# Patient Record
Sex: MALE | Race: WHITE
[De-identification: names, ages, dates, MRNs, and addresses within clinical notes are randomized per-mention and may not be internally consistent; named-entity substitution may affect disease eponyms.]

---

## 2017-04-07 ENCOUNTER — HOSPITAL ENCOUNTER (INPATIENT)
Dept: HOSPITAL 74 - YASAS | Age: 32
LOS: 4 days | Discharge: HOME | End: 2017-04-11
Attending: INTERNAL MEDICINE | Admitting: INTERNAL MEDICINE
Payer: COMMERCIAL

## 2017-04-07 VITALS — BODY MASS INDEX: 20.9 KG/M2

## 2017-04-07 DIAGNOSIS — A60.02: ICD-10-CM

## 2017-04-07 DIAGNOSIS — F17.210: ICD-10-CM

## 2017-04-07 DIAGNOSIS — F16.10: ICD-10-CM

## 2017-04-07 DIAGNOSIS — F13.230: Primary | ICD-10-CM

## 2017-04-07 DIAGNOSIS — F14.20: ICD-10-CM

## 2017-04-07 DIAGNOSIS — F12.20: ICD-10-CM

## 2017-04-07 DIAGNOSIS — F10.230: ICD-10-CM

## 2017-04-07 DIAGNOSIS — F90.2: ICD-10-CM

## 2017-04-07 DIAGNOSIS — F32.9: ICD-10-CM

## 2017-04-07 DIAGNOSIS — F19.24: ICD-10-CM

## 2017-04-07 DIAGNOSIS — J45.909: ICD-10-CM

## 2017-04-07 LAB
APPEARANCE UR: CLEAR
BILIRUB UR STRIP.AUTO-MCNC: NEGATIVE MG/DL
COLOR UR: YELLOW
KETONES UR QL STRIP: NEGATIVE
LEUKOCYTE ESTERASE UR QL STRIP.AUTO: NEGATIVE
NITRITE UR QL STRIP: NEGATIVE
PH UR: 6 [PH] (ref 5–8)
PROT UR QL STRIP: NEGATIVE
PROT UR QL STRIP: NEGATIVE
RBC # UR STRIP: NEGATIVE /UL
SP GR UR: 1.03 (ref 1–1.03)
UROBILINOGEN UR STRIP-MCNC: (no result) E.U./DL (ref 0.2–1)

## 2017-04-07 RX ADMIN — Medication SCH MG: at 23:09

## 2017-04-07 RX ADMIN — NICOTINE SCH: 14 PATCH, EXTENDED RELEASE TRANSDERMAL at 23:08

## 2017-04-07 NOTE — HP
CIWA Score





- CIWA Score


Nausea/Vomitin-No Nausea/No Vomiting


Muscle Tremors: 4-Moderate,w/Arms Extend


Anxiety: 4-Mod. Anxious/Guarded


Agitation: 4-Moderately Restless


Paroxysmal Sweats: 1-Minimal Palms Moist


Orientation: 0-Oriented


Tacttile Disturbances: 0-None


Auditory Disturbances: 0-None


Visual Disturbances: 2-Mild Sensitivity


Headache: 2-Mild


CIWA-Ar Total Score: 17





Admission ROS [facility]





- HPI


Chief Complaint: 


C/O WITHDRAWAL SX'S. SEEKING DETOX TXMENT


Allergies/Adverse Reactions: 


 Allergies











Allergy/AdvReac Type Severity Reaction Status Date / Time


 


No Known Allergies Allergy   Verified 10/15/16 16:50











History of Present Illness: 


31 Y.O Male with polysubstance abuse admitted to detox for alcoholism. client 

is known to [facility]. reports last detox 10/2016. reports longest clean time 4 

months.


Exam Limitations: No Limitations





- Ebola screening


Have you traveled outside of the country in the last 21 days: No


Have you had contact with anyone from an Ebola affected area: No


Have you been sick,other than usual withdrawal symptoms: No


Do you have a fever: No





- Review of Systems


Constitutional: Loss of Appetite, Unintentional Wgt. Loss


EENT: reports: No Symptoms Reported


Respiratory: reports: Cough, Shortness of Breath, Other (asthma)


Cardiac: reports: No Symptoms Reported


GI: reports: Poor Appetite


: reports: No Symptoms Reported


Musculoskeletal: reports: No Symptoms Reported


Integumentary: reports: No Symptoms Reported


Neuro: reports: No Symptoms reported


Endocrine: reports: No Symptoms Reported


Hematology: reports: No Symptoms Reported


Psychiatric: reports: Anxious


Other Systems: Reviewed and Negative





Patient History





- Patient Medical History


Hx Anemia: No


Hx Asthma: Yes (ON ALBUTEROL INHALER)


Hx Chronic Obstructive Pulmonary Disease (COPD): No


Hx Cancer: No


Hx Cardiac Disorders: No


Hx Congestive Heart Failure: No


Hx Hypertension: No


Hx Hypercholesterolemia: No


Hx Pacemaker: No


HX Cerebrovascular Accident: No


Hx Seizures: Yes (not sure)


Hx Dementia: No


Hx Diabetes: Yes


Hx Gastrointestinal Disorders: No


Hx Liver Disease: No


Hx Genitourinary Disorders: Yes (HSV TYPE II)


Hx Sexually Transmitted Disorders: Yes (GENITAL HERPES)


Hx Renal Disease (ESRD): No


Hx Thyroid Disease: No


Hx Human Immunodeficiency Virus (HIV): No


Hx Hepatitis C: No


Hx Depression: Yes (ANXIETY)


Hx Suicide Attempt: No


Hx Bipolar Disorder: Yes


Hx Schizophrenia: Yes





- Patient Surgical History


Past Surgical History: Yes


Hx Neurologic Surgery: No


Hx Cataract Extraction: No


Hx Cardiac Surgery: No


Hx Lung Surgery: No


Hx Breast Surgery: No


Hx Breast Biopsy: No


Hx Abdominal Surgery: No


Hx Appendectomy: No


Hx Cholecystectomy: No


Hx Genitourinary Surgery: No


Hx  Section: No


Hx Orthopedic Surgery: No


Other Surgical History: surgery for deviated septum 


Anesthesia Reaction: No





- PPD History


Previous Implant?: Yes


Documented Results: Negative w/proof


Implanted On Prior [facility]mission?: Yes


Date: 12/07/15


Results: 0 mm


PPD to be Administered?: Yes





- Smoking Cessation


Smoking history: Current every day smoker


Have you smoked in the past 12 months: Yes


Aproximately how many cigarettes per day: 10


Cigars Per Day: 0


Hx Chewing Tobacco Use: No


Initiated information on smoking cessation: Yes


'Breaking Loose' booklet given: 17





- Substance & Tx. History


Hx Alcohol Use: Yes


Hx Substance Use: Yes


Substance Use Type: Alcohol, Cocaine, Marijuana, Tranquilizers (PCP/XANA)


Hx Substance Use Treatment: Yes ([facility])





- Substances Abused


  ** BEER/LIQUOR


Route: Oral


Frequency: Daily


Amount used: 1- 6 PACK/1 PINT


Age of first use: 14


Date of Last Use: 17 (THIS MORNING)





  ** COCAINE


Route: Smoking


Frequency: Daily


Amount used: $50


Age of first use: 23


Date of Last Use: 17





  ** THC


Route: Smoking


Frequency: Daily


Amount used: DIME


Age of first use: 13


Date of Last Use: 17





  ** PCP


Route: Smoking


Frequency: 1-3 times last 30 days


Amount used: 1 BAG


Age of first use: 19


Date of Last Use: 17





  ** XANAX


Route: Oral


Frequency: 3-6 times per week


Age of first use: 23


Date of Last Use: 17





Family Disease History





- Family Disease History


Family Disease History: Diabetes: Grandparent, Father (alcohol,dsa sober [id number]

), Mother (alcohol, on MMTP,HTN), Heart Disease: Mother





Admission Physical Exam [facility]





- Vital Signs


Vital Signs: 


 Vital Signs - 24 hr











  17





  21:27


 


Temperature 87 F L


 


Pulse Rate 81


 


Respiratory 20





Rate 


 


Blood Pressure 123/69














- Physical


General Appearance: Yes: Appropriately Dressed, Tremorous, Anxious


HEENTM: Yes: EOMI, Normocephalic, JABARI, Pharynx Normal, Rhinorrhea


Respiratory: Yes: Chest Non-Tender, Lungs Clear, Normal Breath Sounds, No 

Respiratory Distress, No Accessory Muscle Use


Neck: Yes: No masses,lesions,Nodules, Supple, Trachea in good position


Breast: Yes: Breast Exam Deferred


Cardiology: Yes: Regular Rhythm, Regular Rate, S1, S2


Abdominal: Yes: Normal Bowel Sounds, Non Tender, Soft


Genitourinary: Yes: Within Normal Limits


Back: Yes: Normal Inspection


Musculoskeletal: Yes: full range of Motion, Gait Steady


Extremities: Yes: Normal Capillary Refill, Normal Range of Motion, Non-Tender, 

Tremors


Neurological: Yes: CNs II-XII NML intact, Fully Oriented, Alert, Motor Strength 

5/5


Integumentary: Yes: Normal Color, Warm, Moist


Lymphatic: Yes: Within Normal Limits





- Diagnostic


(1) Asthma


Current Visit: Yes   Status: Chronic





(2) Alcohol dependence with uncomplicated withdrawal


Current Visit: Yes   Status: Chronic





(3) PCP abuse


Current Visit: Yes   Status: Chronic





(4) Nicotine dependence


Current Visit: Yes   Status: Chronic   Qualifiers: 


     Nicotine product type: cigarettes     Substance use status: uncomplicated 

       Qualified Code(s): F17.210 - Nicotine dependence, cigarettes, 

uncomplicated  





(5) Cocaine dependence, uncomplicated


Current Visit: Yes   Status: Acute





(6) Sedative, hypnotic or anxiolytic dependence with withdrawal, uncomplicated


Current Visit: Yes   Status: Acute





(7) Cannabis dependence, uncomplicated


Current Visit: Yes   Status: Chronic








Cleared for Admission [facility]





- Detox or Rehab


[facility] Level of Care: Medically Managed


Detox Regimen/Protocol: Librium





[facility] Breath Alcohol Content


Breath Alcohol Content: 0





Urine Drug Screen





- Results


Drug Screen Negative: No


Urine Drug Screen Results: THC-Marijuana, JAIME-Cocaine, OPI-Opiates, BZO-

Benzodiazepines

## 2017-04-08 LAB
ALBUMIN SERPL-MCNC: 3.8 G/DL (ref 3.4–5)
ALP SERPL-CCNC: 49 U/L (ref 45–117)
ALT SERPL-CCNC: 19 U/L (ref 12–78)
ANION GAP SERPL CALC-SCNC: 11 MMOL/L (ref 8–16)
AST SERPL-CCNC: 15 U/L (ref 15–37)
BILIRUB SERPL-MCNC: 0.6 MG/DL (ref 0.2–1)
CALCIUM SERPL-MCNC: 9 MG/DL (ref 8.5–10.1)
CO2 SERPL-SCNC: 27 MMOL/L (ref 21–32)
COCKROFT - GAULT: 109.87
CREAT SERPL-MCNC: 1 MG/DL (ref 0.7–1.3)
DEPRECATED RDW RBC AUTO: 15.2 % (ref 11.9–15.9)
GLUCOSE SERPL-MCNC: 72 MG/DL (ref 74–106)
MCH RBC QN AUTO: 31 PG (ref 25.7–33.7)
MCHC RBC AUTO-ENTMCNC: 33.3 G/DL (ref 32–35.9)
MCV RBC: 93.3 FL (ref 80–96)
PLATELET # BLD AUTO: 233 K/MM3 (ref 134–434)
PMV BLD: 9.6 FL (ref 7.5–11.1)
PROT SERPL-MCNC: 7 G/DL (ref 6.4–8.2)
WBC # BLD AUTO: 5.1 K/MM3 (ref 4–10)

## 2017-04-08 RX ADMIN — NICOTINE SCH: 14 PATCH, EXTENDED RELEASE TRANSDERMAL at 10:26

## 2017-04-08 RX ADMIN — Medication SCH TAB: at 10:26

## 2017-04-08 RX ADMIN — QUETIAPINE FUMARATE SCH MG: 100 TABLET ORAL at 22:40

## 2017-04-08 RX ADMIN — Medication SCH MG: at 22:40

## 2017-04-08 NOTE — CONSULT
[facility] Psychiatric Consult





- Data


Date of interview: 04/08/17


Admission source: [facility]


Identifying data: Readmission to [facility] for this 30 y/o  male 

seeking detox treatment for alcohol,cannabis,cocaine and phencyclidine 

dependence.Patient is single without children,domiciled,unemployed and 

supported on Public Assistance.


Substance Abuse History: - Smoking Cessation.  Smoking history: Current every 

day smoker.  Have you smoked in the past 12 months: Yes.  Aproximately how many 

cigarettes per day: 10.  Cigars Per Day: 0.  Hx Chewing Tobacco Use: No.  

Initiated information on smoking cessation: Yes.  'Breaking Loose' booklet given

: 04/07/17.  - Substance & Tx. History.  Hx Alcohol Use: Yes.  Hx Substance Use

: Yes.  Substance Use Type: Alcohol, Cocaine, Marijuana, Tranquilizers (PCP/XANA

).  Hx Substance Use Treatment: Yes ([facility]).  - Substances Abused.  ** BEER/

LIQUOR.  Route: Oral.  Frequency: Daily.  Amount used: 1- 6 PACK/1 PINT.  Age 

of first use: 14.  Date of Last Use: 04/07/17 (THIS MORNING).  ** COCAINE.  

Route: Smoking.  Frequency: Daily.  Amount used: $50.  Age of first use: 23.  

Date of Last Use: 04/06/17.  ** THC.  Route: Smoking.  Frequency: Daily.  

Amount used: DIME.  Age of first use: 13.  Date of Last Use: 04/06/17.  ** PCP.

  Route: Smoking.  Frequency: 1-3 times last 30 days.  Amount used: 1 BAG.  Age 

of first use: 19.  Date of Last Use: 04/05/17.  ** XANAX.  Route: Oral.  

Frequency: 3-6 times per week.  Age of first use: 23.  Date of Last Use: 04/05/ 17


Medical History: Bronchial asthma,withdrawal-related seizures,herpes genitalis 

and a history of surgery for deviated septum (2013).


Psychiatric History: Diagnosed with ADHD and Bipolar Disorder.Used to be on 

ritalin (age 8 to 14). Prescribed seroquel 100 mg/hs + 50 mg/day by a private 

psychiatrist in the [place].Mr Dominguez denies history of suicide attempts.


Physical/Sexual Abuse/Trauma History: No history.


Additional Comment: Urine Drug Screen Results: THC-Marijuana, JAIME-Cocaine, OPI-

Opiates, BZO-Benzodiazepines.Noted.





Mental Status Exam





- Mental Status Exam


Alert and Oriented to: Time, Place, Person


Cognitive Function: Good


Patient Appearance: Well Groomed


Mood: Nervous, Withdrawn, Apprehensive


Affect: Mood Congruent


Patient Behavior: Fatigued, Appropriate, Cooperative


Speech Pattern: Clear, Appropriate


Voice Loudness: Normal


Thought Process: Goal Oriented


Thought Disorder: Not Present


Hallucinations: Denies


Suicidal Ideation: Denies


Homicidal Ideation: Denies


Insight/Judgement: Poor


Sleep: Fair


Appetite: Good


Muscle strength/Tone: Normal


Gait/Station: Normal





Psychiatric Findings





- Problem List (Axis 1, 2,3)


(1) Cocaine dependence, uncomplicated


Current Visit: Yes   Status: Acute





(2) Sedative, hypnotic or anxiolytic dependence with withdrawal, uncomplicated


Current Visit: Yes   Status: Acute





(3) Alcohol dependence with uncomplicated withdrawal


Current Visit: Yes   Status: Acute





(4) Cannabis dependence, uncomplicated


Current Visit: Yes   Status: Acute





(5) Nicotine dependence


Current Visit: Yes   Status: Acute   Qualifiers: 


     Nicotine product type: cigarettes     Substance use status: uncomplicated 

       Qualified Code(s): F17.210 - Nicotine dependence, cigarettes, 

uncomplicated  





(6) PCP abuse


Current Visit: Yes   Status: Acute





(7) Drug-induced mood disorder


Current Visit: Yes   Status: Acute





(8) ADHD (attention deficit hyperactivity disorder)


Current Visit: Yes   Status: Chronic   Qualifiers: 


     Attention deficit-hyperactivity disorder type: combined inattentive-

hyperactive     Qualified Code(s): F90.2 - Attention-deficit hyperactivity 

disorder, combined type  





(9) Bipolar disorder


Current Visit: Yes   Status: Chronic   


Comment: History.








(10) Asthma


Current Visit: Yes   Status: Chronic





(11) Herpes simplex of male genitalia


Current Visit: Yes   Status: Chronic








- Initial Treatment Plan


Initial Treatment Plan: Psychoeducation.Detoxification.Seroquel 100 mg po 

hs.Side effects/benefits discussed with the patient.He agrees with this 

careplan.Observation.

## 2017-04-08 NOTE — PN
S CIWA





- CIWA Score


Nausea/Vomitin-No Nausea/No Vomiting


Muscle Tremors: 4-Moderate,w/Arms Extend


Anxiety: 3


Agitation: 3


Paroxysmal Sweats: 3


Orientation: 0-Oriented


Tacttile Disturbances: 0-None


Auditory Disturbances: 0-None


Visual Disturbances: 0-None


Headache: 0-None Present


CIWA-Ar Total Score: 13





B[facility] Progress Note (SOAP)


Subjective: 


Sweating,interrupted sleep,restless,tremors,anxiety


Objective: 





17 14:36


 Vital Signs - 8 hr











  17





  09:37 13:58


 


Temperature 98.4 F 96.9 F L


 


Pulse Rate 74 94 H


 


Respiratory 18 18





Rate  


 


Blood Pressure 107/68 100/76








 Laboratory Tests











  17





  22:18 06:00 06:00


 


WBC   5.1  D 


 


RBC   4.19 


 


Hgb   13.0 


 


Hct   39.1 


 


MCV   93.3 


 


MCHC   33.3 


 


RDW   15.2 


 


Plt Count   233 


 


MPV   9.6 


 


Sodium    143


 


Potassium    4.0


 


Chloride    105


 


Carbon Dioxide    27


 


Anion Gap    11


 


BUN    6 L D


 


Creatinine    1.0


 


Creat Clearance w eGFR    > 60


 


Random Glucose    72 L D


 


Calcium    9.0


 


Total Bilirubin    0.6


 


AST    15


 


ALT    19  D


 


Alkaline Phosphatase    49


 


Total Protein    7.0


 


Albumin    3.8


 


Urine Color  Yellow  


 


Urine Appearance  Clear  


 


Urine pH  6.0  


 


Ur Specific Gravity  1.026  


 


Urine Protein  Negative  


 


Urine Glucose (UA)  Negative  


 


Urine Ketones  Negative  


 


Urine Blood  Negative  


 


Urine Nitrite  Negative  


 


Urine Bilirubin  Negative  


 


Urine Urobilinogen  4.0 e.u/dl  


 


Ur Leukocyte Esterase  Negative  








labs noted


Assessment: 





17 14:38


withdrawal sx


Plan: 


Continue detox

## 2017-04-09 RX ADMIN — NICOTINE SCH: 14 PATCH, EXTENDED RELEASE TRANSDERMAL at 10:22

## 2017-04-09 RX ADMIN — Medication SCH MG: at 22:31

## 2017-04-09 RX ADMIN — Medication SCH TAB: at 10:21

## 2017-04-09 RX ADMIN — QUETIAPINE FUMARATE SCH MG: 100 TABLET ORAL at 22:31

## 2017-04-09 NOTE — PN
S CIWA





- CIWA Score


Nausea/Vomitin-No Nausea/No Vomiting


Muscle Tremors: 3


Anxiety: 4-Mod. Anxious/Guarded


Agitation: 3


Paroxysmal Sweats: 3


Orientation: 0-Oriented


Tacttile Disturbances: 0-None


Auditory Disturbances: 0-None


Visual Disturbances: 0-None


Headache: 0-None Present


CIWA-Ar Total Score: 13





BHS Progress Note (SOAP)


Subjective: 


Anxiety,tremors,sweating,interrupted sleep,restless


Objective: 





17 13:57


 Vital Signs - 8 hr











  17





  06:35 09:41 13:41


 


Temperature 95.1 F L 96 F L 95.6 F L


 


Pulse Rate 82 82 94 H


 


Respiratory 18 18 18





Rate   


 


Blood Pressure 101/66 98/68 104/65








 Laboratory Tests











  17





  22:18 06:00 06:00


 


WBC   5.1  D 


 


RBC   4.19 


 


Hgb   13.0 


 


Hct   39.1 


 


MCV   93.3 


 


MCHC   33.3 


 


RDW   15.2 


 


Plt Count   233 


 


MPV   9.6 


 


Sodium    143


 


Potassium    4.0


 


Chloride    105


 


Carbon Dioxide    27


 


Anion Gap    11


 


BUN    6 L D


 


Creatinine    1.0


 


Creat Clearance w eGFR    > 60


 


Random Glucose    72 L D


 


Calcium    9.0


 


Total Bilirubin    0.6


 


AST    15


 


ALT    19  D


 


Alkaline Phosphatase    49


 


Total Protein    7.0


 


Albumin    3.8


 


Urine Color  Yellow  


 


Urine Appearance  Clear  


 


Urine pH  6.0  


 


Ur Specific Gravity  1.026  


 


Urine Protein  Negative  


 


Urine Glucose (UA)  Negative  


 


Urine Ketones  Negative  


 


Urine Blood  Negative  


 


Urine Nitrite  Negative  


 


Urine Bilirubin  Negative  


 


Urine Urobilinogen  4.0 e.u/dl  


 


Ur Leukocyte Esterase  Negative  


 


RPR Titer   














  17





  06:00


 


WBC 


 


RBC 


 


Hgb 


 


Hct 


 


MCV 


 


MCHC 


 


RDW 


 


Plt Count 


 


MPV 


 


Sodium 


 


Potassium 


 


Chloride 


 


Carbon Dioxide 


 


Anion Gap 


 


BUN 


 


Creatinine 


 


Creat Clearance w eGFR 


 


Random Glucose 


 


Calcium 


 


Total Bilirubin 


 


AST 


 


ALT 


 


Alkaline Phosphatase 


 


Total Protein 


 


Albumin 


 


Urine Color 


 


Urine Appearance 


 


Urine pH 


 


Ur Specific Gravity 


 


Urine Protein 


 


Urine Glucose (UA) 


 


Urine Ketones 


 


Urine Blood 


 


Urine Nitrite 


 


Urine Bilirubin 


 


Urine Urobilinogen 


 


Ur Leukocyte Esterase 


 


RPR Titer  Nonreactive








labs noted


Assessment: 





17 13:57


Withdrawal sx.


Plan: 


Continue detox

## 2017-04-10 RX ADMIN — QUETIAPINE FUMARATE SCH MG: 100 TABLET ORAL at 22:30

## 2017-04-10 RX ADMIN — Medication SCH TAB: at 11:14

## 2017-04-10 RX ADMIN — NICOTINE SCH: 14 PATCH, EXTENDED RELEASE TRANSDERMAL at 11:15

## 2017-04-10 RX ADMIN — Medication SCH MG: at 22:29

## 2017-04-10 NOTE — PN
BHS Progress Note (SOAP)


Subjective: 


Sweating,interrupted sleep,restless


Objective: 





04/10/17 13:29


 Vital Signs - 8 hr











  04/10/17 04/10/17





  06:49 11:03


 


Temperature 96 F L 98.2 F


 


Pulse Rate 96 H 104 H


 


Respiratory 18 20





Rate  


 


Blood Pressure 98/67 106/70








 Laboratory Tests











  04/07/17 04/08/17 04/08/17





  22:18 06:00 06:00


 


WBC   5.1  D 


 


RBC   4.19 


 


Hgb   13.0 


 


Hct   39.1 


 


MCV   93.3 


 


MCHC   33.3 


 


RDW   15.2 


 


Plt Count   233 


 


MPV   9.6 


 


Sodium    143


 


Potassium    4.0


 


Chloride    105


 


Carbon Dioxide    27


 


Anion Gap    11


 


BUN    6 L D


 


Creatinine    1.0


 


Creat Clearance w eGFR    > 60


 


Random Glucose    72 L D


 


Calcium    9.0


 


Total Bilirubin    0.6


 


AST    15


 


ALT    19  D


 


Alkaline Phosphatase    49


 


Total Protein    7.0


 


Albumin    3.8


 


Urine Color  Yellow  


 


Urine Appearance  Clear  


 


Urine pH  6.0  


 


Ur Specific Gravity  1.026  


 


Urine Protein  Negative  


 


Urine Glucose (UA)  Negative  


 


Urine Ketones  Negative  


 


Urine Blood  Negative  


 


Urine Nitrite  Negative  


 


Urine Bilirubin  Negative  


 


Urine Urobilinogen  4.0 e.u/dl  


 


Ur Leukocyte Esterase  Negative  


 


RPR Titer   














  04/08/17





  06:00


 


WBC 


 


RBC 


 


Hgb 


 


Hct 


 


MCV 


 


MCHC 


 


RDW 


 


Plt Count 


 


MPV 


 


Sodium 


 


Potassium 


 


Chloride 


 


Carbon Dioxide 


 


Anion Gap 


 


BUN 


 


Creatinine 


 


Creat Clearance w eGFR 


 


Random Glucose 


 


Calcium 


 


Total Bilirubin 


 


AST 


 


ALT 


 


Alkaline Phosphatase 


 


Total Protein 


 


Albumin 


 


Urine Color 


 


Urine Appearance 


 


Urine pH 


 


Ur Specific Gravity 


 


Urine Protein 


 


Urine Glucose (UA) 


 


Urine Ketones 


 


Urine Blood 


 


Urine Nitrite 


 


Urine Bilirubin 


 


Urine Urobilinogen 


 


Ur Leukocyte Esterase 


 


RPR Titer  Nonreactive








labs noted


Assessment: 





04/10/17 13:30


Withdrawal sx.


Plan: 


Continue detox

## 2017-04-11 VITALS — HEART RATE: 93 BPM | SYSTOLIC BLOOD PRESSURE: 98 MMHG | TEMPERATURE: 95.6 F | DIASTOLIC BLOOD PRESSURE: 66 MMHG

## 2017-04-11 PROCEDURE — HZ2ZZZZ DETOXIFICATION SERVICES FOR SUBSTANCE ABUSE TREATMENT: ICD-10-PCS | Performed by: INTERNAL MEDICINE

## 2017-04-11 NOTE — DS
[facility] Detox Discharge Summary


Admission Date: 


04/07/17





Discharge Date: 04/11/17





- History


Present History: Alcohol Dependence, Cannabis Dependence, Cocaine Dependence


Pertinent Past History: 


Asthma


HSV type II 





- Physical Exam Results


Vital Signs: 


 Vital Signs











Temperature  95.6 F L  04/11/17 06:48


 


Pulse Rate  93 H  04/11/17 06:48


 


Respiratory Rate  16   04/11/17 06:48


 


Blood Pressure  98/66   04/11/17 06:48


 


O2 Sat by Pulse Oximetry (%)      











Pertinent Admission Physical Exam Findings: 


Withdrawal sx.


 Laboratory Last Values











WBC  5.1 K/mm3 (4.0-10.0)  D 04/08/17  06:00    


 


RBC  4.19 M/mm3 (4.00-5.60)   04/08/17  06:00    


 


Hgb  13.0 GM/dL (11.7-16.9)   04/08/17  06:00    


 


Hct  39.1 % (35.4-49)   04/08/17  06:00    


 


MCV  93.3 fl (80-96)   04/08/17  06:00    


 


MCHC  33.3 g/dl (32.0-35.9)   04/08/17  06:00    


 


RDW  15.2 % (11.9-15.9)   04/08/17  06:00    


 


Plt Count  233 K/MM3 (134-434)   04/08/17  06:00    


 


MPV  9.6 fl (7.5-11.1)   04/08/17  06:00    


 


Sodium  143 mmol/L (136-145)   04/08/17  06:00    


 


Potassium  4.0 mmol/L (3.5-5.1)   04/08/17  06:00    


 


Chloride  105 mmol/L ()   04/08/17  06:00    


 


Carbon Dioxide  27 mmol/L (21-32)   04/08/17  06:00    


 


Anion Gap  11  (8-16)   04/08/17  06:00    


 


BUN  6 mg/dL (7-18)  L D 04/08/17  06:00    


 


Creatinine  1.0 mg/dL (0.7-1.3)   04/08/17  06:00    


 


Creat Clearance w eGFR  > 60  (>60)   04/08/17  06:00    


 


Random Glucose  72 mg/dL ()  L D 04/08/17  06:00    


 


Calcium  9.0 mg/dL (8.5-10.1)   04/08/17  06:00    


 


Total Bilirubin  0.6 mg/dL (0.2-1.0)   04/08/17  06:00    


 


AST  15 U/L (15-37)   04/08/17  06:00    


 


ALT  19 U/L (12-78)  D 04/08/17  06:00    


 


Alkaline Phosphatase  49 U/L ()   04/08/17  06:00    


 


Total Protein  7.0 g/dl (6.4-8.2)   04/08/17  06:00    


 


Albumin  3.8 g/dl (3.4-5.0)   04/08/17  06:00    


 


Urine Color  Yellow   04/07/17  22:18    


 


Urine Appearance  Clear   04/07/17  22:18    


 


Urine pH  6.0  (5.0-8.0)   04/07/17  22:18    


 


Ur Specific Gravity  1.026  (1.001-1.035)   04/07/17  22:18    


 


Urine Protein  Negative  (NEGATIVE)   04/07/17  22:18    


 


Urine Glucose (UA)  Negative  (NEGATIVE)   04/07/17  22:18    


 


Urine Ketones  Negative  (NEGATIVE)   04/07/17  22:18    


 


Urine Blood  Negative  (NEGATIVE)   04/07/17  22:18    


 


Urine Nitrite  Negative  (NEGATIVE)   04/07/17  22:18    


 


Urine Bilirubin  Negative  (NEGATIVE)   04/07/17  22:18    


 


Urine Urobilinogen  4.0 e.u/dl E.U./dl (0.2-1.0)   04/07/17  22:18    


 


Ur Leukocyte Esterase  Negative  (NEGATIVE)   04/07/17  22:18    


 


RPR Titer  Nonreactive  (NONREACTIVE)   04/08/17  06:00    








labs ntoed





- Treatment


Hospital Course: Detox Protocol Followed, Detoxed Safely, Responded well, 

Discharged Condition Good, Rehab Referral Accepted


Patient has Accepted a Rehab Referral to: [facility]





- Medication


Discharge Medications: 


Ambulatory Orders





Aripiprazole [Abilify -] 10 mg PO DAILY #30 tablet 12/07/15 


Quetiapine Fumarate [Seroquel -] 300 mg PO HS #30 tablet 12/07/15 


Albuterol Sulfate Inhaler - [Ventolin HFA Inhaler -] 0 puff IH Q4H PRN #0 

inhaler 12/08/15 


Acyclovir [Zovirax -] 400 mg PO DAILY 10/15/16 


Quetiapine Fumarate [Seroquel] 100 mg PO HS #30 tablet 04/08/17 











- Diagnosis


(1) Alcohol dependence with uncomplicated withdrawal


Current Visit: Yes   Status: Acute





(2) Cannabis dependence, uncomplicated


Current Visit: Yes   Status: Acute





(3) Cocaine dependence, uncomplicated


Current Visit: Yes   Status: Acute





(4) Nicotine dependence


Current Visit: Yes   Status: Acute   Qualifiers: 


     Nicotine product type: cigarettes     Substance use status: uncomplicated 

       Qualified Code(s): F17.210 - Nicotine dependence, cigarettes, 

uncomplicated  





(5) ADHD (attention deficit hyperactivity disorder)


Current Visit: Yes   Status: Chronic   Qualifiers: 


     Attention deficit-hyperactivity disorder type: combined inattentive-

hyperactive     Qualified Code(s): F90.2 - Attention-deficit hyperactivity 

disorder, combined type  





(6) Asthma


Current Visit: Yes   Status: Chronic





(7) Herpes simplex of male genitalia


Current Visit: Yes   Status: Chronic





(8) Drug-induced mood disorder


Current Visit: Yes   Status: Acute





(9) Sedative, hypnotic or anxiolytic dependence with withdrawal, uncomplicated


Current Visit: Yes   Status: Acute





(10) Bipolar disorder


Current Visit: Yes   Status: Chronic   








- AMA


Did Patient Leave Against Medical Advice: No

## 2018-01-02 ENCOUNTER — HOSPITAL ENCOUNTER (INPATIENT)
Dept: HOSPITAL 74 - YASAS | Age: 33
LOS: 4 days | Discharge: HOME | End: 2018-01-06
Attending: INTERNAL MEDICINE | Admitting: INTERNAL MEDICINE
Payer: COMMERCIAL

## 2018-01-02 VITALS — BODY MASS INDEX: 25.7 KG/M2

## 2018-01-02 DIAGNOSIS — F14.20: ICD-10-CM

## 2018-01-02 DIAGNOSIS — F17.210: ICD-10-CM

## 2018-01-02 DIAGNOSIS — G47.00: ICD-10-CM

## 2018-01-02 DIAGNOSIS — J45.909: ICD-10-CM

## 2018-01-02 DIAGNOSIS — F10.230: Primary | ICD-10-CM

## 2018-01-02 DIAGNOSIS — F31.9: ICD-10-CM

## 2018-01-02 LAB
APPEARANCE UR: CLEAR
BILIRUB UR STRIP.AUTO-MCNC: NEGATIVE MG/DL
COLOR UR: (no result)
KETONES UR QL STRIP: NEGATIVE
LEUKOCYTE ESTERASE UR QL STRIP.AUTO: NEGATIVE
NITRITE UR QL STRIP: NEGATIVE
PH UR: 6 [PH] (ref 5–8)
PROT UR QL STRIP: NEGATIVE
PROT UR QL STRIP: NEGATIVE
RBC # UR STRIP: NEGATIVE /UL
SP GR UR: 1.01 (ref 1–1.03)
UROBILINOGEN UR STRIP-MCNC: NEGATIVE MG/DL (ref 0.2–1)

## 2018-01-02 PROCEDURE — HZ2ZZZZ DETOXIFICATION SERVICES FOR SUBSTANCE ABUSE TREATMENT: ICD-10-PCS | Performed by: INTERNAL MEDICINE

## 2018-01-02 RX ADMIN — Medication SCH MG: at 22:16

## 2018-01-02 NOTE — HP
CIWA Score





- CIWA Score


Nausea/Vomitin-No Nausea/No Vomiting


Muscle Tremors: 4-Moderate,w/Arms Extend


Anxiety: 4-Mod. Anxious/Guarded


Agitation: 4-Moderately Restless


Paroxysmal Sweats: 3


Orientation: 0-Oriented


Tacttile Disturbances: 0-None


Auditory Disturbances: 0-None


Visual Disturbances: 0-None


Headache: 1-Very Mild


CIWA-Ar Total Score: 16





Admission ROS [facility]





- HPI


Chief Complaint: 





I am here to stop drinking and go to rehab. 


Allergies/Adverse Reactions: 


 Allergies











Allergy/AdvReac Type Severity Reaction Status Date / Time


 


No Known Allergies Allergy   Verified 18 10:36











History of Present Illness: 





pt is a 32yr old male with a history of alcohol dependence seeking detox for 

treatment.


Exam Limitations: No Limitations





- Ebola screening


Have you traveled outside of the country in the last 21 days: No


Have you had contact with anyone from an Ebola affected area: No


Have you been sick,other than usual withdrawal symptoms: No


Do you have a fever: No





- Review of Systems


Constitutional: No Symptoms Reported


EENT: reports: No Symptoms Reported


Respiratory: reports: No Symptoms reported


Cardiac: reports: No Symptoms Reported, Syncope


GI: reports: Nausea, Poor Fluid Intake


: reports: No Symptoms Reported


Musculoskeletal: reports: No Symptoms Reported


Integumentary: reports: Flushing, Sweating


Neuro: reports: Headache, Tingling, Tremors


Endocrine: reports: Excessive Sweating, Flushing, Intolerance to Cold, 

Intolerance to Heat


Hematology: reports: No Symptoms Reported


Psychiatric: reports: Judgement Intact, Mood/Affect Appropiate, Orientated x3, 

Agitated, Anxious


Other Systems: Reviewed and Negative





Patient History





- Patient Medical History


Hx Anemia: No


Hx Asthma: Yes (Pt is on MDI)


Hx Chronic Obstructive Pulmonary Disease (COPD): No


Hx Cancer: No


Hx Cardiac Disorders: No


Hx Congestive Heart Failure: No


Hx Hypertension: No


Hx Hypercholesterolemia: No


Hx Pacemaker: No


HX Cerebrovascular Accident: No


Hx Seizures: No


Hx Dementia: No


Hx Diabetes: No


Hx Gastrointestinal Disorders: No


Hx Liver Disease: No


Hx Genitourinary Disorders: No


Hx Sexually Transmitted Disorders: No


Hx Renal Disease (ESRD): No


Hx Thyroid Disease: No


Hx Human Immunodeficiency Virus (HIV): No (NEGATIVE HX)


Hx Hepatitis C: No (negatice)


Hx Depression: Yes


Hx Suicide Attempt: No


Hx Bipolar Disorder: No


Hx Schizophrenia: No


Other Medical History: anxiety





- Patient Surgical History


Past Surgical History: Yes


Hx Neurologic Surgery: No


Hx Cataract Extraction: No


Hx Cardiac Surgery: No


Hx Lung Surgery: No


Hx Breast Surgery: No


Hx Breast Biopsy: No


Hx Abdominal Surgery: No


Hx Appendectomy: No


Hx Cholecystectomy: No


Hx Genitourinary Surgery: No


Hx  Section: No


Hx Orthopedic Surgery: No


Other Surgical History: surgery for deviated septum 


Anesthesia Reaction: No





- PPD History


Previous Implant?: Yes


Documented Results: Negative w/proof


Implanted On Prior [facility] Admission?: Yes


Date: 17


Results: 0 MM


PPD to be Administered?: No





- Reproductive History


Patient is a Female of Child Bearing Age (11 -55 yrs old): No





- Smoking Cessation


Smoking history: Current every day smoker


Have you smoked in the past 12 months: Yes


Aproximately how many cigarettes per day: 10


Cigars Per Day: 0


Hx Chewing Tobacco Use: No


Initiated information on smoking cessation: Yes


'Breaking Loose' booklet given: 18





- Substance & Tx. History


Hx Alcohol Use: Yes


Hx Substance Use: No


Substance Use Type: Alcohol, Cocaine, Marijuana


Hx Substance Use Treatment: Yes (last detox [facility] 2017)





- Substances Abused


  ** Alcohol


Route: Oral


Frequency: Daily


Amount used: 2 24 OZ CANS BEER


Age of first use: 28


Date of Last Use: 18





  ** Cocaine


Route: Inhalation


Frequency: Daily


Amount used: $20-50


Age of first use: 24


Date of Last Use: 17





  ** Marijuana/Hashish


Route: Smoking


Frequency: Daily


Amount used: 2-3 JOINTS


Age of first use: 13


Date of Last Use: 18





Family Disease History





- Family Disease History


Family Disease History: Diabetes: Grandparent, Father (alcohol,dsa sober s8nisew

), Mother (alcohol, on MMTP,HTN), Heart Disease: Mother





Admission Physical Exam [facility]





- Vital Signs


Vital Signs: 


 Vital Signs - 24 hr











  18





  10:00


 


Temperature 96.7 F L


 


Pulse Rate 76


 


Respiratory 20





Rate 


 


Blood Pressure 105/58














- Physical


General Appearance: Yes: Appropriately Dressed, Moderate Distress, Tremorous, 

Irritable, Sweating, Anxious


HEENTM: Yes: Hearing grossly Normal, Normal Voice


Respiratory: Yes: Lungs Clear, Normal Breath Sounds, No Respiratory Distress


Neck: Yes: No masses,lesions,Nodules


Breast: Yes: Within Normal Limits


Cardiology: Yes: Irregular


Abdominal: Yes: Non Tender, Soft


Genitourinary: Yes: Within Normal Limits


Back: Yes: Normal Inspection


Musculoskeletal: Yes: full range of Motion


Extremities: Yes: Normal Capillary Refill, Normal Inspection, Tremors


Neurological: Yes: Fully Oriented, Alert, Normal Response


Integumentary: Yes: Normal Color, Diaphoresis


Lymphatic: Yes: Within Normal Limits





- Diagnostic


(1) Alcohol dependence with uncomplicated withdrawal


Current Visit: Yes   Status: Chronic   





(2) Cannabis dependence


Current Visit: Yes   Status: Chronic   





(3) Cocaine dependence


Current Visit: Yes   Status: Acute   


Qualifiers: 


   Substance use status: uncomplicated   Qualified Code(s): F14.20 - Cocaine 

dependence, uncomplicated   





(4) Alcohol-induced sleep disorder


Current Visit: No   Status: Acute   





(5) Cocaine dependence, uncomplicated


Current Visit: No   Status: Acute   





(6) Drug-induced mood disorder


Current Visit: No   Status: Acute   





(7) Insomnia


Current Visit: No   Status: Acute   


Qualifiers: 


   Insomnia type: unspecified   Qualified Code(s): G47.00 - Insomnia, 

unspecified   





(8) Nicotine dependence


Current Visit: Yes   Status: Chronic   


Qualifiers: 


   Nicotine product type: cigarettes   Substance use status: uncomplicated   

Qualified Code(s): F17.210 - Nicotine dependence, cigarettes, uncomplicated   





(9) Syncope


Current Visit: No   Status: Acute   





(10) Asthma


Current Visit: Yes   Status: Chronic   





Cleared for Admission [facility]





- Detox or Rehab


[facility] Level of Care: Medically Managed


Detox Regimen/Protocol: Librium





[facility] Breath Alcohol Content


Breath Alcohol Content: 0.062





Urine Drug Screen





- Results


Drug Screen Negative: No


Urine Drug Screen Results: THC-Marijuana, JAIME-Cocaine, BZO-Benzodiazepines

## 2018-01-02 NOTE — CONSULT
[facility] Psychiatric Consult





- Data


Date of interview: 01/02/17


Admission source: [facility]


Identifying data: Pt. is a 32 year old male, single, without kids and currently 

unemployed. This is one of multiple admissions for patient. Pt. admitted to 


Substance Abuse History: Following information confirmed by Mr. Dominguez:  - 

Smoking Cessation.  Smoking history: Current every day smoker.  Have you smoked 

in the past 12 months: Yes.  Aproximately how many cigarettes per day: 10.  

Cigars Per Day: 0.  Hx Chewing Tobacco Use: No.  Initiated information on 

smoking cessation: Yes.  'Breaking Loose' booklet given: 01/02/18.  - Substance 

& Tx. History.  Hx Alcohol Use: Yes.  Hx Substance Use: No.  Substance Use Type

: Alcohol, Cocaine, Marijuana.  Hx Substance Use Treatment: Yes (last detox [facility] 

12/2017).  - Substances Abused.  ** Alcohol.  Route: Oral.  Frequency: Daily.  

Amount used: 2 24 OZ CANS BEER.  Age of first use: 28.  Date of Last Use: 01/02/ 18.  ** Cocaine.  Route: Inhalation.  Frequency: Daily.  Amount used: $20-50.  

Age of first use: 24.  Date of Last Use: 12/31/17.  ** Marijuana/Hashish.  Route

: Smoking.  Frequency: Daily.  Amount used: 2-3 JOINTS.  Age of first use: 13.  

Date of Last Use: 01/01/18


Medical History: Asthma, Surgery for deviated septum in 2013.


Psychiatric History: Pt. is guarded and minimizing information given to 

hospital. Pt. denies h/o past psychiatric hospitalization. As per Dr. Hook 

consultation note on 11/14/17, patient reports psychiatric hospitalization at 

[facility] and [facility]. States he is diagnosed with bipolar 

disorder.  Pt. was also on ritalin from ages 8 to 14 for ADHD. Pt. currently 

reports seeing a psychiatrist in the [place] who prescribes him seroquel 50mg. 

Pt. is a poor historian. Pt. denies h/o suicide attempts.


Physical/Sexual Abuse/Trauma History: Denies.


Additional Comment: Urine Drug Screen Results: THC-Marijuana, JAIME-Cocaine, BZO-

Benzodiazepines





Mental Status Exam





- Mental Status Exam


Alert and Oriented to: Time, Place, Person


Cognitive Function: Fair


Patient Appearance: Unkempt


Mood: Withdrawn


Affect: Flat


Patient Behavior: Guarded


Speech Pattern: Delayed


Voice Loudness: Normal


Thought Process: Thought Blocking


Thought Disorder: Not Present


Hallucinations: Denies


Suicidal Ideation: Denies


Homicidal Ideation: Denies


Insight/Judgement: Poor


Sleep: Poorly


Appetite: Fair


Muscle strength/Tone: Normal


Gait/Station: Normal





Psychiatric Findings





- Problem List (Axis 1, 2,3)


(1) Alcohol dependence with uncomplicated withdrawal


Current Visit: Yes   Status: Acute   





(2) Cocaine dependence


Current Visit: Yes   Status: Acute   


Qualifiers: 


   Substance use status: uncomplicated   Qualified Code(s): F14.20 - Cocaine 

dependence, uncomplicated   





(3) Cannabis dependence


Current Visit: Yes   Status: Chronic   





(4) Nicotine dependence


Current Visit: Yes   Status: Chronic   


Qualifiers: 


   Nicotine product type: cigarettes   Substance use status: uncomplicated   

Qualified Code(s): F17.210 - Nicotine dependence, cigarettes, uncomplicated   





(5) Cocaine dependence, uncomplicated


Current Visit: Yes   Status: Acute   





(6) Insomnia


Current Visit: Yes   Status: Acute   


Qualifiers: 


   Insomnia type: unspecified   Qualified Code(s): G47.00 - Insomnia, 

unspecified   





(7) Bipolar disorder


Current Visit: Yes   Status: Chronic   Comment: Self reports.   





- Initial Treatment Plan


Initial Treatment Plan: Psychoeducation provided. Detoxification provided. 

Seroquel 50mg qhs ordered. Pharmacy claims reviewed. Verbal consent given. 

Benefits and side effects discussed. Will continue to monitor.

## 2018-01-03 LAB
ALBUMIN SERPL-MCNC: 3.9 G/DL (ref 3.4–5)
ALP SERPL-CCNC: 59 U/L (ref 45–117)
ALT SERPL-CCNC: 26 U/L (ref 12–78)
ANION GAP SERPL CALC-SCNC: 9 MMOL/L (ref 8–16)
AST SERPL-CCNC: 18 U/L (ref 15–37)
BILIRUB SERPL-MCNC: 0.7 MG/DL (ref 0.2–1)
BUN SERPL-MCNC: 12 MG/DL (ref 7–18)
CALCIUM SERPL-MCNC: 9 MG/DL (ref 8.5–10.1)
CHLORIDE SERPL-SCNC: 105 MMOL/L (ref 98–107)
CO2 SERPL-SCNC: 28 MMOL/L (ref 21–32)
CREAT SERPL-MCNC: 1 MG/DL (ref 0.7–1.3)
DEPRECATED RDW RBC AUTO: 14.3 % (ref 11.9–15.9)
GLUCOSE SERPL-MCNC: 98 MG/DL (ref 74–106)
HCT VFR BLD CALC: 43.5 % (ref 35.4–49)
HGB BLD-MCNC: 14.2 GM/DL (ref 11.7–16.9)
MCH RBC QN AUTO: 30 PG (ref 25.7–33.7)
MCHC RBC AUTO-ENTMCNC: 32.5 G/DL (ref 32–35.9)
MCV RBC: 92.3 FL (ref 80–96)
PLATELET # BLD AUTO: 232 K/MM3 (ref 134–434)
PMV BLD: 9.9 FL (ref 7.5–11.1)
POTASSIUM SERPLBLD-SCNC: 3.8 MMOL/L (ref 3.5–5.1)
PROT SERPL-MCNC: 7.6 G/DL (ref 6.4–8.2)
RBC # BLD AUTO: 4.72 M/MM3 (ref 4–5.6)
SODIUM SERPL-SCNC: 142 MMOL/L (ref 136–145)
WBC # BLD AUTO: 8.1 K/MM3 (ref 4–10)

## 2018-01-03 RX ADMIN — Medication SCH MG: at 22:31

## 2018-01-03 RX ADMIN — NICOTINE SCH: 21 PATCH TRANSDERMAL at 10:43

## 2018-01-03 RX ADMIN — Medication SCH TAB: at 10:42

## 2018-01-03 NOTE — PN
S CIWA





- CIWA Score


Nausea/Vomitin-No Nausea/No Vomiting


Muscle Tremors: 4-Moderate,w/Arms Extend


Anxiety: 3


Agitation: 3


Paroxysmal Sweats: 3


Orientation: 0-Oriented


Tacttile Disturbances: 0-None


Auditory Disturbances: 0-None


Visual Disturbances: 0-None


Headache: 1-Very Mild


CIWA-Ar Total Score: 14





[facility] Progress Note (SOAP)


Subjective: 





sweats


chills


agitation


interrupted sleep


Objective: 





18 10:50


 Vital Signs











Temperature  98.8 F   18 10:46


 


Pulse Rate  94 H  18 10:46


 


Respiratory Rate  18   18 10:46


 


Blood Pressure  119/72   18 10:46


 


O2 Sat by Pulse Oximetry (%)      








 Laboratory Tests











  18





  11:55 15:45 06:00


 


WBC    8.1  D


 


RBC    4.72


 


Hgb    14.2


 


Hct    43.5


 


MCV    92.3


 


MCH    30.0


 


MCHC    32.5


 


RDW    14.3


 


Plt Count    232


 


MPV    9.9  D


 


Sodium   


 


Potassium   


 


Chloride   


 


Carbon Dioxide   


 


Anion Gap   


 


BUN   


 


Creatinine   


 


Creat Clearance w eGFR   


 


Random Glucose   


 


Calcium   


 


Total Bilirubin   


 


AST   


 


ALT   


 


Alkaline Phosphatase   


 


Total Protein   


 


Albumin   


 


Urine Color   Ltyellow 


 


Urine Appearance   Clear 


 


Urine pH   6.0 


 


Ur Specific Gravity   1.013 


 


Urine Protein   Negative 


 


Urine Glucose (UA)   Negative 


 


Urine Ketones   Negative 


 


Urine Blood   Negative 


 


Urine Nitrite   Negative 


 


Urine Bilirubin   Negative 


 


Urine Urobilinogen   Negative 


 


Ur Leukocyte Esterase   Negative 


 


HIV 1&2 Antibody Screen  Negative  


 


HIV P24 Antigen  Negative  














  18





  06:00


 


WBC 


 


RBC 


 


Hgb 


 


Hct 


 


MCV 


 


MCH 


 


MCHC 


 


RDW 


 


Plt Count 


 


MPV 


 


Sodium  142


 


Potassium  3.8


 


Chloride  105


 


Carbon Dioxide  28


 


Anion Gap  9


 


BUN  12


 


Creatinine  1.0


 


Creat Clearance w eGFR  > 60


 


Random Glucose  98  D


 


Calcium  9.0


 


Total Bilirubin  0.7


 


AST  18


 


ALT  26  D


 


Alkaline Phosphatase  59  D


 


Total Protein  7.6


 


Albumin  3.9


 


Urine Color 


 


Urine Appearance 


 


Urine pH 


 


Ur Specific Gravity 


 


Urine Protein 


 


Urine Glucose (UA) 


 


Urine Ketones 


 


Urine Blood 


 


Urine Nitrite 


 


Urine Bilirubin 


 


Urine Urobilinogen 


 


Ur Leukocyte Esterase 


 


HIV 1&2 Antibody Screen 


 


HIV P24 Antigen 








aaox3


ambulating


no acute distress


Assessment: 





18 10:51


withdrawal sx


Plan: 





continue detox


increase fluids

## 2018-01-03 NOTE — EKG
Test Reason : 

Blood Pressure : ***/*** mmHG

Vent. Rate : 076 BPM     Atrial Rate : 076 BPM

   P-R Int : 146 ms          QRS Dur : 090 ms

    QT Int : 368 ms       P-R-T Axes : 052 059 046 degrees

   QTc Int : 414 ms

 

NORMAL SINUS RHYTHM WITH SINUS ARRHYTHMIA

MODERATE VOLTAGE CRITERIA FOR LVH, MAY BE NORMAL VARIANT

BORDERLINE ECG

WHEN COMPARED WITH ECG OF 13-NOV-2017 19:29,

NO SIGNIFICANT CHANGE WAS FOUND

Confirmed by MD Catalino, Marcello ([id number]) on 1/3/2018 9:50:02 AM

 

Referred By: MARCI MALIK           Confirmed By:Marcello Bae MD

## 2018-01-04 RX ADMIN — Medication SCH TAB: at 10:43

## 2018-01-04 RX ADMIN — NICOTINE SCH: 21 PATCH TRANSDERMAL at 10:42

## 2018-01-04 RX ADMIN — Medication SCH MG: at 22:12

## 2018-01-04 NOTE — PN
S CIWA





- CIWA Score


Nausea/Vomitin-No Nausea/No Vomiting


Muscle Tremors: 3


Anxiety: 2


Agitation: 3


Paroxysmal Sweats: 2


Orientation: 0-Oriented


Tacttile Disturbances: 0-None


Auditory Disturbances: 0-None


Visual Disturbances: 0-None


Headache: 0-None Present


CIWA-Ar Total Score: 10





[facility] Progress Note (SOAP)


Subjective: 





sweats


interrupted sleep


agitation





Objective: 





18 09:33


 Vital Signs











Temperature  92.5 F L  18 04:00


 


Pulse Rate  64   18 04:00


 


Respiratory Rate  18   18 04:00


 


Blood Pressure  96/59   18 04:00


 


O2 Sat by Pulse Oximetry (%)      








 Laboratory Tests











  18





  11:55 15:45 06:00


 


WBC    8.1  D


 


RBC    4.72


 


Hgb    14.2


 


Hct    43.5


 


MCV    92.3


 


MCH    30.0


 


MCHC    32.5


 


RDW    14.3


 


Plt Count    232


 


MPV    9.9  D


 


Sodium   


 


Potassium   


 


Chloride   


 


Carbon Dioxide   


 


Anion Gap   


 


BUN   


 


Creatinine   


 


Creat Clearance w eGFR   


 


Random Glucose   


 


Calcium   


 


Total Bilirubin   


 


AST   


 


ALT   


 


Alkaline Phosphatase   


 


Total Protein   


 


Albumin   


 


Urine Color   Ltyellow 


 


Urine Appearance   Clear 


 


Urine pH   6.0 


 


Ur Specific Gravity   1.013 


 


Urine Protein   Negative 


 


Urine Glucose (UA)   Negative 


 


Urine Ketones   Negative 


 


Urine Blood   Negative 


 


Urine Nitrite   Negative 


 


Urine Bilirubin   Negative 


 


Urine Urobilinogen   Negative 


 


Ur Leukocyte Esterase   Negative 


 


RPR Titer   


 


HIV 1&2 Antibody Screen  Negative  


 


HIV P24 Antigen  Negative  














  18





  06:00 06:00


 


WBC  


 


RBC  


 


Hgb  


 


Hct  


 


MCV  


 


MCH  


 


MCHC  


 


RDW  


 


Plt Count  


 


MPV  


 


Sodium  142 


 


Potassium  3.8 


 


Chloride  105 


 


Carbon Dioxide  28 


 


Anion Gap  9 


 


BUN  12 


 


Creatinine  1.0 


 


Creat Clearance w eGFR  > 60 


 


Random Glucose  98  D 


 


Calcium  9.0 


 


Total Bilirubin  0.7 


 


AST  18 


 


ALT  26  D 


 


Alkaline Phosphatase  59  D 


 


Total Protein  7.6 


 


Albumin  3.9 


 


Urine Color  


 


Urine Appearance  


 


Urine pH  


 


Ur Specific Gravity  


 


Urine Protein  


 


Urine Glucose (UA)  


 


Urine Ketones  


 


Urine Blood  


 


Urine Nitrite  


 


Urine Bilirubin  


 


Urine Urobilinogen  


 


Ur Leukocyte Esterase  


 


RPR Titer   Nonreactive


 


HIV 1&2 Antibody Screen  


 


HIV P24 Antigen  








aaox3


ambulating


no acute distress


Assessment: 





18 09:34


withdrawal sx


Plan: 





continue detox


increase fluids


ensure plus bid

## 2018-01-05 RX ADMIN — Medication SCH TAB: at 10:46

## 2018-01-05 RX ADMIN — NICOTINE SCH: 21 PATCH TRANSDERMAL at 10:49

## 2018-01-05 RX ADMIN — Medication SCH MG: at 22:02

## 2018-01-05 NOTE — PN
[facility] Progress Note (SOAP)


Subjective: 





anxiety


sweats


Objective: 





01/05/18 10:22


 Vital Signs











Temperature  96.1 F L  01/05/18 09:38


 


Pulse Rate  90   01/05/18 09:38


 


Respiratory Rate  17   01/05/18 09:38


 


Blood Pressure  130/78   01/05/18 09:38


 


O2 Sat by Pulse Oximetry (%)      








aaox3


ambulating


no acute distress


Assessment: 





01/05/18 10:22


withdrawal sx


Plan: 





continue detox


d/c in am

## 2018-01-06 VITALS — HEART RATE: 83 BPM | TEMPERATURE: 97.4 F | SYSTOLIC BLOOD PRESSURE: 101 MMHG | DIASTOLIC BLOOD PRESSURE: 60 MMHG

## 2018-01-06 RX ADMIN — Medication SCH TAB: at 09:34

## 2018-01-06 RX ADMIN — NICOTINE SCH: 21 PATCH TRANSDERMAL at 09:34

## 2018-01-06 NOTE — PN
[facility] Progress Note


Note: 





PATIENT HAS HISTORY OF ASTHMA,ON ALBUTEROL INHALER,LUNG CLEAR,DISCHARGE WITH 

ALBUTEROL INHALER PRN Q 4 HRS

## 2018-01-06 NOTE — DS
[facility] Detox Discharge Summary


Admission Date: 


01/02/18





Discharge Date: 01/06/18





- History


Present History: Alcohol Dependence, Cannabis Dependence, Cocaine Dependence


Additional Comments: 





FOLLOW UP WITH AFTER CARE PROGRAM AS ARRANGEMENT


Pertinent Past History: 





ASTHMA


BIPOLAR DISORDER





- Physical Exam Results


Vital Signs: 


 Vital Signs











Temperature  97.4 F L  01/06/18 06:22


 


Pulse Rate  83   01/06/18 06:22


 


Respiratory Rate  18   01/06/18 06:22


 


Blood Pressure  101/60   01/06/18 06:22


 


O2 Sat by Pulse Oximetry (%)      











Pertinent Admission Physical Exam Findings: 





WITHDRAWAL SYMPTOM





- Treatment


Hospital Course: Detox Protocol Followed, Detoxed Safely, Responded well, 

Discharged Condition Good, Rehab Referral Accepted


Patient has Accepted a Rehab Referral to: [facility]





- Medication


Discharge Medications: 


Ambulatory Orders





Aripiprazole [Abilify -] 10 mg PO DAILY #30 tablet 12/07/15 


Quetiapine Fumarate [Seroquel] 100 mg PO HS #30 tablet 04/08/17 


Albuterol Sulfate Inhaler - [Ventolin HFA Inhaler -] 2 puff IH Q4H PRN #1 

inhaler 11/16/17 











- AMA


Did Patient Leave Against Medical Advice: No

## 2018-04-25 ENCOUNTER — EMERGENCY (EMERGENCY)
Facility: HOSPITAL | Age: 33
LOS: 1 days | Discharge: HOME | End: 2018-04-25
Admitting: EMERGENCY MEDICINE
Payer: COMMERCIAL

## 2018-04-25 VITALS
SYSTOLIC BLOOD PRESSURE: 118 MMHG | HEART RATE: 73 BPM | OXYGEN SATURATION: 98 % | RESPIRATION RATE: 18 BRPM | TEMPERATURE: 98 F | DIASTOLIC BLOOD PRESSURE: 82 MMHG | HEIGHT: 73 IN | WEIGHT: 199.96 LBS

## 2018-04-25 DIAGNOSIS — J45.909 UNSPECIFIED ASTHMA, UNCOMPLICATED: ICD-10-CM

## 2018-04-25 DIAGNOSIS — F17.200 NICOTINE DEPENDENCE, UNSPECIFIED, UNCOMPLICATED: ICD-10-CM

## 2018-04-25 DIAGNOSIS — Z79.899 OTHER LONG TERM (CURRENT) DRUG THERAPY: ICD-10-CM

## 2018-04-25 LAB — HIV 1+2 AB+HIV1 P24 AG SERPL QL IA: SIGNIFICANT CHANGE UP

## 2018-04-25 PROCEDURE — 99284 EMERGENCY DEPT VISIT MOD MDM: CPT

## 2018-04-25 PROCEDURE — 94640 AIRWAY INHALATION TREATMENT: CPT

## 2018-04-25 PROCEDURE — 99283 EMERGENCY DEPT VISIT LOW MDM: CPT | Mod: 25

## 2018-04-25 PROCEDURE — 36415 COLL VENOUS BLD VENIPUNCTURE: CPT

## 2018-04-25 PROCEDURE — 87389 HIV-1 AG W/HIV-1&-2 AB AG IA: CPT

## 2018-04-25 RX ORDER — ALBUTEROL 90 UG/1
2 AEROSOL, METERED ORAL
Qty: 1 | Refills: 0 | OUTPATIENT
Start: 2018-04-25 | End: 2018-05-24

## 2018-04-25 RX ORDER — IPRATROPIUM/ALBUTEROL SULFATE 18-103MCG
3 AEROSOL WITH ADAPTER (GRAM) INHALATION ONCE
Qty: 0 | Refills: 0 | Status: COMPLETED | OUTPATIENT
Start: 2018-04-25 | End: 2018-04-25

## 2018-04-25 RX ADMIN — Medication 3 MILLILITER(S): at 09:17

## 2018-04-25 NOTE — ED PROVIDER NOTE - OBJECTIVE STATEMENT
31 y/o m hx asthma presents stating ran out of albuterol inhaler 3 days ago, has been feeling some chest tightness in the past day.  Pt stating he has mild cough and nasal congestion as well.  Denies fever, chills, recent travel, sob, sick contacts, all other ROS negative.

## 2018-04-25 NOTE — ED ADULT NURSE NOTE - OBJECTIVE STATEMENT
Pt presents complaining of increased sob and "asthma over the past week. I have been using my albuterol inhaler 6x/day and waking up in the middle of the night unable to breath. I have had cough with yellow phlegm x1 week." Pt states that he has been taking his Flovent as directed and saw his PCP 3 months ago. Pt denies any sick contacts, no fever, no lightheadedness, no dizziness, no cp, no n/v, no changes to bowels, no urinary complaints.

## 2018-04-25 NOTE — ED PROVIDER NOTE - MEDICAL DECISION MAKING DETAILS
33 y/o m hx asthma presents stating ran out of albuterol inhaler, having some chest tightness; given neb prior to evaluation, no wheezing in ED, vss, will rx albuterol inhaler, f/u pmd

## 2018-05-03 ENCOUNTER — HOSPITAL ENCOUNTER (INPATIENT)
Dept: HOSPITAL 74 - YASAS | Age: 33
LOS: 3 days | Discharge: HOME | End: 2018-05-06
Attending: INTERNAL MEDICINE | Admitting: INTERNAL MEDICINE
Payer: COMMERCIAL

## 2018-05-03 VITALS — BODY MASS INDEX: 23.7 KG/M2

## 2018-05-03 DIAGNOSIS — F91.8: ICD-10-CM

## 2018-05-03 DIAGNOSIS — Z91.19: ICD-10-CM

## 2018-05-03 DIAGNOSIS — F14.20: ICD-10-CM

## 2018-05-03 DIAGNOSIS — J45.909: ICD-10-CM

## 2018-05-03 DIAGNOSIS — F17.213: ICD-10-CM

## 2018-05-03 DIAGNOSIS — F10.230: Primary | ICD-10-CM

## 2018-05-03 DIAGNOSIS — R63.4: ICD-10-CM

## 2018-05-03 DIAGNOSIS — F12.20: ICD-10-CM

## 2018-05-03 PROCEDURE — HZ2ZZZZ DETOXIFICATION SERVICES FOR SUBSTANCE ABUSE TREATMENT: ICD-10-PCS | Performed by: INTERNAL MEDICINE

## 2018-05-03 RX ADMIN — Medication SCH MG: at 23:48

## 2018-05-03 NOTE — HP
CIWA Score





- CIWA Score


Nausea/Vomitin


Muscle Tremors: 2


Anxiety: 3


Agitation: 3


Paroxysmal Sweats: 3


Orientation: 0-Oriented


Tacttile Disturbances: 0-None


Auditory Disturbances: 0-None


Visual Disturbances: 2-Mild Sensitivity


Headache: 0-None Present


CIWA-Ar Total Score: 15





Admission ROS S





- HPI


Allergies/Adverse Reactions: 


 Allergies











Allergy/AdvReac Type Severity Reaction Status Date / Time


 


No Known Allergies Allergy   Verified 18 20:52











History of Present Illness: 








31 yo male with hx of nicotine, alcohol, THC, cocaine and opioid dependence . 

Reports was in Suboxone program but stopped attending and reports he has not 

taken medication in about a week, he is currently residing in shelter, as per 

patient the medication was confiscated. PMHX: asthma, anxiety, depression, 

insomnia. Denies suicidal / homicidal ideation. Last detox at [facility] 2 months ago. 

Longest period of sobriety 8 months.








The Drug Utilization Report below displays all of the controlled substance 

prescriptions, if any, that your patient has filled in the last twelve months. 

The information displayed on this report is compiled from pharmacy submissions 

to the Department, and accurately reflects the information as submitted by the 

pharmacies.


This report was requested by: Maty Iqbal | Reference #: [id number] 


Others' Prescriptions











Patient Name: Mark Dominguez YOB: 1985


 


Address: [address] [place] [address] Sex: Male














Rx Written Rx Dispensed Drug Quantity Days Supply Prescriber Name  


 


2018 suboxone 8 mg-2 mg sl film  28 14 David Hedrick MD  


 


2018 suboxone 8 mg-2 mg sl film  60 30 David Hedrick MD  


 


2018 suboxone 8 mg-2 mg sl film  60 30 RyleyDavid sepulveda MD  


 


2018 suboxone 8 mg-2 mg sl film  60 30 David Hedrick MD  


 


2017 suboxone 8 mg-2 mg sl film  60 30 RyleyDavid sepulveda MD  


 


2017 suboxone 8 mg-2 mg sl film  60 30 RyleyDavid sepulveda MD  


 


2017 suboxone 8 mg-2 mg sl film  60 30 RyleyDavid osorio MD  


 


2017 10/06/2017 zolpidem tartrate 5 mg tablet  30 30 Alessio Gutierrez  


 


10/06/2017 10/06/2017 suboxone 8 mg-2 mg sl film  60 30 RyleyDavid osorio MD  


 


2017 clonazepam 0.5 mg tablet  30 30 Alessio Gutierrez  


 


2017 zolpidem tartrate 5 mg tablet  30 30 RyleyDavid osorio MD

  


 


2017 suboxone 8 mg-2 mg sl film  60 30 RyleyDavid osorio MD  


 


2017 suboxone 8 mg-2 mg sl film  60 30 RyleyDavid MD  


 


2017 zolpidem tartrate 5 mg tablet  30 30 Alessio Gutierrez  


 


2017 clonazepam 0.5 mg tablet  60 30 Alessio Gutierrez  


 


2017 zolpidem tartrate 5 mg tablet  30 30 Jenna Bishop ([place])  


 


2017 clonazepam 0.5 mg tablet  60 30 Jenna Bishop (PA)  


 


2017 suboxone 8 mg-2 mg sl film  60 30 RyleyDavid sepulveda MD  


 


2017 zolpidem tartrate 5 mg tablet  30 30 Jenna Bishop ([place])  


 


2017 clonazepam 0.5 mg tablet  60 30 Jenna Bishop ([place])  


 


2017 suboxone 8 mg-2 mg sl film  60 30 RyleyDavid osorio MD  


 


2017 clonazepam 0.5 mg tablet  60 30 Jenna Bishop (PA)  


 


2017 zolpidem tartrate 5 mg tablet  30 30 Jenna Bishop ([place])  


 


2017 suboxone 8 mg-2 mg sl film  60 30 RyleyDavid osorio MD  














Patient Name: Mark Dominguez YOB: 1985


 


Address: [address] [place] [address] Sex: Male














Rx Written Rx Dispensed Drug Quantity Days Supply Prescriber Name  


 


11/10/2017 2017 lorazepam 2 mg tablet  8 2 Marcello Garza)  











Exam Limitations: No Limitations





- Ebola screening


Have you traveled outside of the country in the last 21 days: No (N)


Have you had contact with anyone from an Ebola affected area: No


Have you been sick,other than usual withdrawal symptoms: No


Do you have a fever: No





- Review of Systems


Constitutional: Diaphoresis, Loss of Appetite, Changes in sleep, Unintentional 

Wgt. Loss (10 lbs in the past month)


EENT: reports: Other (stigmatism left eye)


Respiratory: reports: No Symptoms reported


Cardiac: reports: No Symptoms Reported


GI: reports: Nausea, Poor Appetite, Poor Fluid Intake, Abdominal cramping


: reports: No Symptoms Reported


Musculoskeletal: reports: No Symptoms Reported


Integumentary: reports: No Symptoms Reported


Neuro: reports: No Symptoms reported


Endocrine: reports: Increased Thirst


Hematology: reports: No Symptoms Reported


Psychiatric: reports: Orientated x3, Anxious


Other Systems: Reviewed and Negative





Patient History





- Patient Medical History


Hx Anemia: No


Hx Asthma: Yes (Pt is on MDI)


Hx Chronic Obstructive Pulmonary Disease (COPD): No


Hx Cancer: No


Hx Cardiac Disorders: No


Hx Congestive Heart Failure: No


Hx Hypertension: No


Hx Hypercholesterolemia: No


Hx Pacemaker: No


HX Cerebrovascular Accident: No


Hx Seizures: No


Hx Dementia: No


Hx Diabetes: No


Hx Gastrointestinal Disorders: No


Hx Liver Disease: No


Hx Genitourinary Disorders: No


Hx Sexually Transmitted Disorders: No


Hx Renal Disease (ESRD): No


Hx Thyroid Disease: No


Hx Human Immunodeficiency Virus (HIV): No (NEGATIVE HX)


Hx Hepatitis C: No (negatice)


Hx Depression: Yes


Hx Suicide Attempt: No


Hx Bipolar Disorder: No


Hx Schizophrenia: No





- Patient Surgical History


Past Surgical History: Yes


Hx Neurologic Surgery: No


Hx Cataract Extraction: No


Hx Cardiac Surgery: No


Hx Lung Surgery: No


Hx Breast Surgery: No


Hx Breast Biopsy: No


Hx Abdominal Surgery: No


Hx Appendectomy: No


Hx Cholecystectomy: No


Hx Genitourinary Surgery: No


Hx  Section: No


Hx Orthopedic Surgery: No


Other Surgical History: surgery for deviated septum 


Anesthesia Reaction: No





- PPD History


Previous Implant?: Yes


Documented Results: Negative w/proof


Date: 17


Results: 0 MM


PPD to be Administered?: Yes





- Reproductive History


Patient is a Female of Child Bearing Age (11 -55 yrs old): No





- Smoking Cessation


Smoking history: Current every day smoker


Have you smoked in the past 12 months: Yes


Aproximately how many cigarettes per day: 10


Cigars Per Day: 0


Hx Chewing Tobacco Use: No


Initiated information on smoking cessation: Yes


'Breaking Loose' booklet given: 18





- Substance & Tx. History


Hx Alcohol Use: Yes


Hx Substance Use: Yes


Substance Use Type: Alcohol


Hx Substance Use Treatment: Yes ([facility] two months ago )





- Substances Abused


  ** Alcohol


Route: Oral


Frequency: Daily


Amount used: LIQUOR-1 PINT, BEER- 3(24oz)


Age of first use: 13


Date of Last Use: 18





  ** Cocaine


Route: Smoking


Frequency: Daily


Amount used: 3 joints


Age of first use: 23


Date of Last Use: 18





Family Disease History





- Family Disease History


Family Disease History: Diabetes: Grandparent, Father (alcohol,dsa sober [id number]

), Mother (alcohol, on MMTP,HTN), Heart Disease: Mother





Admission Physical Exam [facility]





- Vital Signs


Vital Signs: 


 Vital Signs - 24 hr











  18





  20:02


 


Temperature 98.1 F


 


Pulse Rate 94 H


 


Respiratory 20





Rate 


 


Blood Pressure 137/74














- Physical


General Appearance: Yes: Appropriately Dressed, Thin, Anxious


HEENTM: Yes: EOMI, Hearing grossly Normal, Normal ENT Inspection, Normocephalic

, Normal Voice, JABARI, Pharynx Normal, Tm's normal, Rhinorrhea


Respiratory: Yes: Chest Non-Tender, Lungs Clear, No Respiratory Distress, No 

Accessory Muscle Use, Wheezing


Neck: Yes: Within Normal Limits


Breast: Yes: Breast Exam Deferred


Cardiology: Yes: Regular Rhythm, Regular Rate


Abdominal: Yes: Normal Bowel Sounds, Non Tender, Flat, Soft


Genitourinary: Yes: Within Normal Limits


Back: Yes: Normal Inspection


Musculoskeletal: Yes: full range of Motion, Gait Steady, Pelvis Stable


Extremities: Yes: Normal Capillary Refill, Normal Range of Motion, Non-Tender


Neurological: Yes: CNs II-XII NML intact, Fully Oriented, Alert, Motor Strength 

5/5


Integumentary: Yes: Normal Color, Warm, Diaphoresis


Lymphatic: Yes: Within Normal Limits





- Diagnostic


(1) Asthma


Current Visit: Yes   Status: Acute   


Qualifiers: 


   Asthma severity: unspecified severity   Asthma persistence: unspecified 





(2) Wheezing


Current Visit: Yes   Status: Acute   





(3) Weight loss


Current Visit: Yes   Status: Acute   





(4) Cocaine dependence


Current Visit: Yes   Status: Acute   


Qualifiers: 


   Substance use status: uncomplicated   Qualified Code(s): F14.20 - Cocaine 

dependence, uncomplicated   





(5) Insomnia


Current Visit: Yes   Status: Acute   


Qualifiers: 


   Insomnia type: unspecified   Qualified Code(s): G47.00 - Insomnia, 

unspecified   





(6) Alcohol dependence with uncomplicated withdrawal


Current Visit: Yes   Status: Chronic   





(7) Cannabis dependence


Current Visit: Yes   Status: Chronic   





(8) Nicotine dependence


Current Visit: Yes   Status: Chronic   


Qualifiers: 


   Nicotine product type: cigarettes   Substance use status: uncomplicated   

Qualified Code(s): F17.210 - Nicotine dependence, cigarettes, uncomplicated   





Cleared for Admission [facility]





- Detox or Rehab


S Level of Care: Medically Managed


Detox Regimen/Protocol: Librium





[facility] Breath Alcohol Content


Breath Alcohol Content: 0.040





Urine Drug Screen





- Results


Drug Screen Negative: No


Urine Drug Screen Results: THC-Marijuana, JAIME-Cocaine

## 2018-05-04 LAB
ALBUMIN SERPL-MCNC: 3.3 G/DL (ref 3.4–5)
ALP SERPL-CCNC: 49 U/L (ref 45–117)
ALT SERPL-CCNC: 53 U/L (ref 12–78)
ANION GAP SERPL CALC-SCNC: 7 MMOL/L (ref 8–16)
AST SERPL-CCNC: 31 U/L (ref 15–37)
BILIRUB SERPL-MCNC: 0.5 MG/DL (ref 0.2–1)
BUN SERPL-MCNC: 14 MG/DL (ref 7–18)
CALCIUM SERPL-MCNC: 8.4 MG/DL (ref 8.5–10.1)
CHLORIDE SERPL-SCNC: 107 MMOL/L (ref 98–107)
CO2 SERPL-SCNC: 28 MMOL/L (ref 21–32)
CREAT SERPL-MCNC: 1.1 MG/DL (ref 0.7–1.3)
DEPRECATED RDW RBC AUTO: 14.6 % (ref 11.9–15.9)
GLUCOSE SERPL-MCNC: 112 MG/DL (ref 74–106)
HCT VFR BLD CALC: 41.9 % (ref 35.4–49)
HGB BLD-MCNC: 13.9 GM/DL (ref 11.7–16.9)
MCH RBC QN AUTO: 31.1 PG (ref 25.7–33.7)
MCHC RBC AUTO-ENTMCNC: 33.1 G/DL (ref 32–35.9)
MCV RBC: 94 FL (ref 80–96)
PLATELET # BLD AUTO: 206 K/MM3 (ref 134–434)
PMV BLD: 8.8 FL (ref 7.5–11.1)
POTASSIUM SERPLBLD-SCNC: 3.7 MMOL/L (ref 3.5–5.1)
PROT SERPL-MCNC: 6.3 G/DL (ref 6.4–8.2)
RBC # BLD AUTO: 4.46 M/MM3 (ref 4–5.6)
SODIUM SERPL-SCNC: 142 MMOL/L (ref 136–145)
WBC # BLD AUTO: 7.1 K/MM3 (ref 4–10)

## 2018-05-04 RX ADMIN — NICOTINE SCH MG: 21 PATCH TRANSDERMAL at 10:06

## 2018-05-04 RX ADMIN — QUETIAPINE FUMARATE SCH MG: 100 TABLET ORAL at 22:30

## 2018-05-04 RX ADMIN — Medication SCH TAB: at 10:05

## 2018-05-04 RX ADMIN — Medication SCH MG: at 22:30

## 2018-05-04 NOTE — CONSULT
[facility] Psychiatric Consult





- Data


Date of interview: 05/04/18


Admission source: [facility]


Identifying data: Readmission to [facility] for this 31 y/o  male 

seeking detox treatment [address] for alcohol and cocaine dependence.Patient 

is single without children,domiciled,unemployed and supported on Public 

Assistance.


Substance Abuse History: Confirmed by patient in this session.Details in 

current [facility] report : Smoking history: Current every day smoker.  Have you 

smoked in the past 12 months: Yes.  Aproximately how many cigarettes per day: 

10.  Cigars Per Day: 0.  Hx Chewing Tobacco Use: No.  Initiated information on 

smoking cessation: Yes.  'Breaking Loose' booklet given: 05/03/18.  - Substance 

& Tx. History.  Hx Alcohol Use: Yes.  Hx Substance Use: Yes.  Substance Use Type

: Alcohol.  Hx Substance Use Treatment: Yes ([facility] two months ago ).  - 

Substances Abused.  ** Alcohol.  Route: Oral.  Frequency: Daily.  Amount used: 

LIQUOR-1 PINT, BEER- 3(24oz).  Age of first use: 13.  Date of Last Use: 05/03/ 18.  ** Cocaine.  Route: Smoking.  Frequency: Daily.  Amount used: 3 joints.  

Age of first use: 23.  Date of Last Use: 05/03/18


Medical History: Bronchial asthma,antecedent of withdrawal-related seizures,

herpes genitalis and a history of surgery for deviated septum (2013).


Psychiatric History: Patient denies history of mental illness / past 

psychiatric hospitalizations in this interview. However, he provided a 

different version of longitudinal history at a previous encounter with this 

clinician as evidenced by this imported note (11/14/2017) : " Patient admits to 

past psychiatric hospitalizations at [facility] and [facility].Diagnosed with Bipolar Disorder.Used to be on ritalin (age 8 to 14) 

for ADHD.No OPD care for several months (more than one year).Mr Alberto gets 

scripts for seroquel (issued by a primary care doctor) for insomnia." Patient 

denies history of suicide attempts.


Physical/Sexual Abuse/Trauma History: Patient denies.


Additional Comment: Urine Drug Screen Results: THC-Marijuana, JAIME-Cocaine.Noted.





Mental Status Exam





- Mental Status Exam


Alert and Oriented to: Time, Place, Person


Cognitive Function: Good


Patient Appearance: Well Groomed


Mood: Hopeful, Euthymic


Affect: Appropriate, Normal Range


Patient Behavior: Fatigued, Appropriate, Cooperative


Speech Pattern: Clear


Voice Loudness: Normal


Thought Process: Intact, Goal Oriented


Thought Disorder: Not Present


Hallucinations: Denies


Suicidal Ideation: Denies


Homicidal Ideation: Denies


Insight/Judgement: Poor


Sleep: Poorly, Difficulty falling asleep


Appetite: Good


Muscle strength/Tone: Normal


Gait/Station: Normal





Psychiatric Findings





- Problem List (Axis 1, 2,3)


(1) Alcohol dependence with uncomplicated withdrawal


Current Visit: Yes   Status: Acute   





(2) Cocaine dependence, uncomplicated


Current Visit: Yes   Status: Acute   





(3) Nicotine dependence


Current Visit: Yes   Status: Acute   


Qualifiers: 


   Nicotine product type: cigarettes   Substance use status: in withdrawal   

Qualified Code(s): F17.213 - Nicotine dependence, cigarettes, with withdrawal   





(4) Drug-induced mood disorder


Current Visit: Yes   Status: Acute   





(5) Insomnia


Current Visit: Yes   Status: Acute   


Qualifiers: 


   Insomnia type: unspecified   Qualified Code(s): G47.00 - Insomnia, 

unspecified   





- Initial Treatment Plan


Initial Treatment Plan: Psychoeducation.Detoxification.Group therapy/AA 

meetings.Seroquel 100 mg po hs.Side effects/benefits discussed with the 

patient.Mr Dominguez agrees with this careplan.Observation.

## 2018-05-05 RX ADMIN — QUETIAPINE FUMARATE SCH MG: 100 TABLET ORAL at 22:04

## 2018-05-05 RX ADMIN — Medication SCH: at 10:55

## 2018-05-05 RX ADMIN — NICOTINE SCH: 21 PATCH TRANSDERMAL at 10:55

## 2018-05-05 RX ADMIN — Medication SCH MG: at 22:04

## 2018-05-05 NOTE — PN
S CIWA





- CIWA Score


Nausea/Vomitin-No Nausea/No Vomiting


Muscle Tremors: 3


Anxiety: 5


Agitation: 5


Paroxysmal Sweats: 3


Orientation: 0-Oriented


Tacttile Disturbances: 0-None


Auditory Disturbances: 0-None


Visual Disturbances: 0-None


Headache: 0-None Present


CIWA-Ar Total Score: 16





[facility] Progress Note (SOAP)


Subjective: 





Anxious, Sweating, Tremors, Interrupted Sleep.


Objective: 


PATIENT A & O X 3, OBSERVED AMBULATING ON UNIT. NO ACUTE DISTRESS.





18 13:10


 Vital Signs











Temperature  98.6 F   18 09:11


 


Pulse Rate  59 L  18 09:11


 


Respiratory Rate  18   18 09:11


 


Blood Pressure  91/55   18 09:11


 


O2 Sat by Pulse Oximetry (%)      








 Laboratory Tests











  18





  07:30 07:30 07:30


 


WBC   7.1 


 


RBC   4.46 


 


Hgb   13.9 


 


Hct   41.9 


 


MCV   94.0 


 


MCH   31.1 


 


MCHC   33.1 


 


RDW   14.6 


 


Plt Count   206 


 


MPV   8.8  D 


 


Sodium    142


 


Potassium    3.7


 


Chloride    107


 


Carbon Dioxide    28


 


Anion Gap    7 L


 


BUN    14


 


Creatinine    1.1


 


Creat Clearance w eGFR    > 60


 


Random Glucose    112 H


 


Calcium    8.4 L


 


Total Bilirubin    0.5  D


 


AST    31  D


 


ALT    53  D


 


Alkaline Phosphatase    49


 


Total Protein    6.3 L


 


Albumin    3.3 L


 


RPR Titer   


 


HIV 1&2 Antibody Screen  Negative  


 


HIV P24 Antigen  Negative  














  18





  07:30


 


WBC 


 


RBC 


 


Hgb 


 


Hct 


 


MCV 


 


MCH 


 


MCHC 


 


RDW 


 


Plt Count 


 


MPV 


 


Sodium 


 


Potassium 


 


Chloride 


 


Carbon Dioxide 


 


Anion Gap 


 


BUN 


 


Creatinine 


 


Creat Clearance w eGFR 


 


Random Glucose 


 


Calcium 


 


Total Bilirubin 


 


AST 


 


ALT 


 


Alkaline Phosphatase 


 


Total Protein 


 


Albumin 


 


RPR Titer  Nonreactive


 


HIV 1&2 Antibody Screen 


 


HIV P24 Antigen 








LABS NOTED.


UA RESULTS PENDING.


18 13:11





Assessment: 





18 13:10


WITHDRAWAL SYMPTOMS.


Plan: 





CONTINUE DETOX.


INCREASE DAILY PO FLUID INTAKE.

## 2018-05-06 VITALS — DIASTOLIC BLOOD PRESSURE: 84 MMHG | HEART RATE: 94 BPM | TEMPERATURE: 95.4 F | SYSTOLIC BLOOD PRESSURE: 118 MMHG

## 2018-05-06 RX ADMIN — Medication SCH: at 10:59

## 2018-05-06 RX ADMIN — NICOTINE SCH: 21 PATCH TRANSDERMAL at 10:59

## 2018-05-06 NOTE — PN
[facility] Progress Note (SOAP)


Subjective: 





ANXIETY,IRRITABILITY, FATIGUE. ALERT O X 3. OOB AMBULATING WITH STEADY GAIT.


Objective: 





05/06/18 10:57


 Vital Signs











Temperature  96.1 F L  05/06/18 09:13


 


Pulse Rate  68   05/06/18 09:13


 


Respiratory Rate  18   05/06/18 09:13


 


Blood Pressure  84/52   05/06/18 09:13


 


O2 Sat by Pulse Oximetry (%)      








 Laboratory Last Values











WBC  7.1 K/mm3 (4.0-10.0)   05/04/18  07:30    


 


RBC  4.46 M/mm3 (4.00-5.60)   05/04/18  07:30    


 


Hgb  13.9 GM/dL (11.7-16.9)   05/04/18  07:30    


 


Hct  41.9 % (35.4-49)   05/04/18  07:30    


 


MCV  94.0 fl (80-96)   05/04/18  07:30    


 


MCH  31.1 pg (25.7-33.7)   05/04/18  07:30    


 


MCHC  33.1 g/dl (32.0-35.9)   05/04/18  07:30    


 


RDW  14.6 % (11.9-15.9)   05/04/18  07:30    


 


Plt Count  206 K/MM3 (134-434)   05/04/18  07:30    


 


MPV  8.8 fl (7.5-11.1)  D 05/04/18  07:30    


 


Sodium  142 mmol/L (136-145)   05/04/18  07:30    


 


Potassium  3.7 mmol/L (3.5-5.1)   05/04/18  07:30    


 


Chloride  107 mmol/L ()   05/04/18  07:30    


 


Carbon Dioxide  28 mmol/L (21-32)   05/04/18  07:30    


 


Anion Gap  7  (8-16)  L  05/04/18  07:30    


 


BUN  14 mg/dL (7-18)   05/04/18  07:30    


 


Creatinine  1.1 mg/dL (0.7-1.3)   05/04/18  07:30    


 


Creat Clearance w eGFR  > 60  (>60)   05/04/18  07:30    


 


Random Glucose  112 mg/dL ()  H  05/04/18  07:30    


 


Calcium  8.4 mg/dL (8.5-10.1)  L  05/04/18  07:30    


 


Total Bilirubin  0.5 mg/dL (0.2-1.0)  D 05/04/18  07:30    


 


AST  31 U/L (15-37)  D 05/04/18  07:30    


 


ALT  53 U/L (12-78)  D 05/04/18  07:30    


 


Alkaline Phosphatase  49 U/L ()   05/04/18  07:30    


 


Total Protein  6.3 g/dl (6.4-8.2)  L  05/04/18  07:30    


 


Albumin  3.3 g/dl (3.4-5.0)  L  05/04/18  07:30    


 


RPR Titer  Nonreactive  (NONREACTIVE)   05/04/18  07:30    


 


HIV 1&2 Antibody Screen  Negative   05/04/18  07:30    


 


HIV P24 Antigen  Negative   05/04/18  07:30    











Assessment: 





05/06/18 10:57


WITHDRAWAL SX


Plan: 





CONTINUE DETOX

## 2018-05-06 NOTE — DS
[facility] Detox Discharge Summary


Admission Date: 


05/03/18





Discharge Date: 05/06/18





- History


Present History: Alcohol Dependence, Cannabis Dependence, Cocaine Dependence


Additional Comments: 





PT WAS DISCHARGED DUE TO AGRESSIVE BEHAVIOR TOWARDS COUNSELOR MAUREEN JOAQUIN  ON 

THE UNIT. SECURITY WAS CALLED TO THE UNIT PER NURSING STAFF, CHAO. (PLEASE 

SEE COUNSELOR'S AND NURSE'S NOTES).


Pertinent Past History: 





PLEASE SEE DX BELOW





- Physical Exam Results


Vital Signs: 


 Vital Signs











Temperature  95.4 F L  05/06/18 13:09


 


Pulse Rate  94 H  05/06/18 13:09


 


Respiratory Rate  20   05/06/18 13:09


 


Blood Pressure  118/84   05/06/18 13:09


 


O2 Sat by Pulse Oximetry (%)      











Pertinent Admission Physical Exam Findings: 





WITHDRAWAL SX


 Laboratory Last Values











WBC  7.1 K/mm3 (4.0-10.0)   05/04/18  07:30    


 


RBC  4.46 M/mm3 (4.00-5.60)   05/04/18  07:30    


 


Hgb  13.9 GM/dL (11.7-16.9)   05/04/18  07:30    


 


Hct  41.9 % (35.4-49)   05/04/18  07:30    


 


MCV  94.0 fl (80-96)   05/04/18  07:30    


 


MCH  31.1 pg (25.7-33.7)   05/04/18  07:30    


 


MCHC  33.1 g/dl (32.0-35.9)   05/04/18  07:30    


 


RDW  14.6 % (11.9-15.9)   05/04/18  07:30    


 


Plt Count  206 K/MM3 (134-434)   05/04/18  07:30    


 


MPV  8.8 fl (7.5-11.1)  D 05/04/18  07:30    


 


Sodium  142 mmol/L (136-145)   05/04/18  07:30    


 


Potassium  3.7 mmol/L (3.5-5.1)   05/04/18  07:30    


 


Chloride  107 mmol/L ()   05/04/18  07:30    


 


Carbon Dioxide  28 mmol/L (21-32)   05/04/18  07:30    


 


Anion Gap  7  (8-16)  L  05/04/18  07:30    


 


BUN  14 mg/dL (7-18)   05/04/18  07:30    


 


Creatinine  1.1 mg/dL (0.7-1.3)   05/04/18  07:30    


 


Creat Clearance w eGFR  > 60  (>60)   05/04/18  07:30    


 


Random Glucose  112 mg/dL ()  H  05/04/18  07:30    


 


Calcium  8.4 mg/dL (8.5-10.1)  L  05/04/18  07:30    


 


Total Bilirubin  0.5 mg/dL (0.2-1.0)  D 05/04/18  07:30    


 


AST  31 U/L (15-37)  D 05/04/18  07:30    


 


ALT  53 U/L (12-78)  D 05/04/18  07:30    


 


Alkaline Phosphatase  49 U/L ()   05/04/18  07:30    


 


Total Protein  6.3 g/dl (6.4-8.2)  L  05/04/18  07:30    


 


Albumin  3.3 g/dl (3.4-5.0)  L  05/04/18  07:30    


 


RPR Titer  Nonreactive  (NONREACTIVE)   05/04/18  07:30    


 


HIV 1&2 Antibody Screen  Negative   05/04/18  07:30    


 


HIV P24 Antigen  Negative   05/04/18  07:30    














- Treatment


Hospital Course: Discharged Condition Good





- Medication


Discharge Medications: 


Ambulatory Orders





Aripiprazole [Abilify -] 10 mg PO DAILY #30 tablet 12/07/15 


Albuterol Sulfate Inhaler - [Ventolin HFA Inhaler -] 2 puff IH Q4H PRN #1 

inhaler 01/06/18 


Quetiapine Fumarate [Seroquel] 100 mg PO HS #30 tablet 05/05/18 











- Diagnosis


(1) Asthma


Current Visit: Yes   Status: Chronic   


Qualifiers: 


   Asthma severity: mild   Asthma persistence: unspecified   Asthma 

complication type: uncomplicated   Qualified Code(s): J45.909 - Unspecified 

asthma, uncomplicated   





(2) Weight loss


Current Visit: Yes   Status: Acute   





(3) Alcohol dependence with uncomplicated withdrawal


Current Visit: Yes   Status: Acute   





(4) Cannabis dependence


Current Visit: Yes   Status: Acute   





(5) Nicotine dependence


Current Visit: Yes   Status: Acute   


Qualifiers: 


   Nicotine product type: cigarettes   Substance use status: in withdrawal   

Qualified Code(s): F17.213 - Nicotine dependence, cigarettes, with withdrawal   





(6) Cocaine dependence, uncomplicated


Current Visit: Yes   Status: Acute   





- AMA


Did Patient Leave Against Medical Advice: No (DUE TO AGRESSIVE  BEHAVIOR 

TOWARDS COUNSELOR)

## 2019-12-19 NOTE — ED ADULT NURSE NOTE - DISCHARGE DATE/TIME
Hypertriglyceridemia Treatment: I explained this is common when taking isotretinoin. If this worsens they will contact us. They may try OTC ibuprofen.
Kilograms Preamble Statement (Weight Entered In Details Tab): Reported Weight in kilograms:
Weight Units: pounds
Hypercholesterolemia Monitoring: I explained this is common when taking isotretinoin. We will monitor closely.
Headache Monitoring: I recommended monitoring the headaches for now. There is no evidence of increased intracranial pressure. They were instructed to call if the headaches are worsening.
Female Completion Statement: After discussing her treatment course we decided to discontinue isotretinoin therapy at this time. I explained that she would need to continue her birth control methods for at least one month after the last dosage. She should also get a pregnancy test one month after the last dose. She shouldn't donate blood for one month after the last dose. She should call with any new symptoms of depression.
Female Pregnancy Counseling Text: Female patients should also be on two forms of birth control while taking this medication and for one month after their last dose.
Myalgia Monitoring: I explained this is common when taking isotretinoin. If this worsens they will contact us.
Male Completion Statement: After discussing his treatment course we decided to discontinue isotretinoin therapy at this time. He shouldn't donate blood for one month after the last dose. He should call with any new symptoms of depression.
Use Enhanced Counseling Feature (Automatic): No
Patient Weight (Optional But Required For Cumulative Dose-Numbers And Decimals Only): 190
Cheilitis Normal Treatment: I recommended application of Vaseline or Aquaphor numerous times a day (as often as every hour) and before going to bed.
Cheilitis Aggressive Treatment: I recommended application of Vaseline or Aquaphor numerous times a day (as often as every hour) and before going to bed. I also prescribed a topical steroid for twice daily use.
Months Of Therapy Completed: 1
Add Associated Diagnosis When Managing Medication Side Effects: Yes
Retinoid Dermatitis Aggressive Treatment: I recommended more frequent application of Cetaphil or CeraVe to the areas of dermatitis. I also prescribed a topical steroid for twice daily use until the dermatitis resolves.
Next Month's Dosage: Continue Current Dosage
Retinoid Dermatitis Normal Treatment: I recommended more frequent application of Cetaphil or CeraVe to the areas of dermatitis.
Nosebleeds Normal Treatment: I explained this is common when taking isotretinoin. I recommended saline mist in each nostril multiple times a day. If this worsens they will contact us.
Dosing Month 1 (Required For Cumulative Dosing): 20mg Daily
Xerosis Normal Treatment: I recommended application of Cetaphil or CeraVe numerous times a day going to bed to all dry areas.
Xerosis Aggressive Treatment: I recommended application of Cetaphil or CeraVe numerous times a day going to bed to all dry areas. I also prescribed a topical steroid for twice daily use.
Detail Level: Zone
Is Xerosis Present?: Yes - Normal Treatment
What Is The Patient's Gender: Male
Use Therapeutic Ranged Or Therapeutic Target: please select Range or Target
Xerosis Normal Treatment: I recommended application of Cetaphil or CeraVe numerous times a day and before going to bed to all dry areas.
Xerosis Aggressive Treatment: I recommended application of Cetaphil or CeraVe numerous times a day and before going to bed to all dry areas. I also prescribed a topical steroid for twice daily use.
Lower Range (In Mg/Kg): 120
Upper Range (In Mg/Kg): 150
Are Labs Available For Review?: No- Not Drawn Yet
Target Cumulative Dosage (In Mg/Kg): 135
Counseling Text: I reviewed the side effect in detail. Patient should get monthly blood tests, not donate blood, not drive at night if vision affected, and not share medication.
Pounds Preamble Statement (Weight Entered In Details Tab): Reported Weight in pounds:
25-Apr-2018 09:26

## 2022-07-26 NOTE — ED ADULT NURSE NOTE - CAS DISCH TRANSFER METHOD
O-T Advancement Flap Text: The defect edges were debeveled with a #15 scalpel blade.  Given the location of the defect, shape of the defect and the proximity to free margins an O-T advancement flap was deemed most appropriate.  Using a sterile surgical marker, an appropriate advancement flap was drawn incorporating the defect and placing the expected incisions within the relaxed skin tension lines where possible.    The area thus outlined was incised deep to adipose tissue with a #15 scalpel blade.  The skin margins were undermined to an appropriate distance in all directions utilizing iris scissors. Private car

## 2023-06-28 NOTE — EKG
----- Message from Maricruz Huertas MD sent at 6/24/2023  6:37 PM CDT -----  Also advise bring vitamin bottes he tales daily with him  to July appointment ([facility])  ----- Message -----  From: Maricruz Huertas MD  Sent: 6/24/2023   6:36 PM CDT  To: VINAYAK Huertas  Nurse Msg Pool     Call    Please advise patient lab work shows uti cleared    Thank you  Dr. Huertas     Test Reason : 

Blood Pressure : ***/*** mmHG

Vent. Rate : 072 BPM     Atrial Rate : 072 BPM

   P-R Int : 164 ms          QRS Dur : 096 ms

    QT Int : 378 ms       P-R-T Axes : 082 076 061 degrees

   QTc Int : 413 ms

 

NORMAL SINUS RHYTHM

POSSIBLE LEFT ATRIAL ENLARGEMENT

LEFT VENTRICULAR HYPERTROPHY

INCOMPLETE RBBB

 

Confirmed by CODY BARNARD MD ([id number]) on 5/4/2018 9:48:23 AM

 

Referred By:             Confirmed By:CODY BARNARD MD